# Patient Record
Sex: FEMALE
[De-identification: names, ages, dates, MRNs, and addresses within clinical notes are randomized per-mention and may not be internally consistent; named-entity substitution may affect disease eponyms.]

---
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
## 2023-01-30 NOTE — ASSESSMENT
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  Mild allergic rhinitis to seasonal pollens is seen as well.  She is headed off to college.\par \par We discussed keeping safe in an college environment with food allergies and use of Epi pen\par She currently is not interested in food challenges or RASTs so these were witheld.\par \par will follow up 1 year.\par \par Joe Peacock MD, FAAP, FAAAAI\par [facility]\par [facility]\par [facility]\par [facility] [place]\par [address] [facility] [address], [facility] [address], [place]  [address]\par [address] [place] [address], [address]  [address]\par [phone]\par  2 seconds or less